# Patient Record
Sex: MALE | Race: WHITE
[De-identification: names, ages, dates, MRNs, and addresses within clinical notes are randomized per-mention and may not be internally consistent; named-entity substitution may affect disease eponyms.]

---

## 2017-03-14 NOTE — REP
Clinical:  Pain and disability.

 

Technique:  AP, lateral, coned-down views of the lumbosacral spine.

 

Findings:

Alignment and lordosis maintained.  No acute fracture / compression injury or

subluxation.  Mild endplate sclerosis with disc space narrowing at the L5-S1

level noted.

 

Impression:

Mild degenerative disc disease at the L5-S1 level.

 

 

Signed by

Haseeb Gomez MD 03/14/2017 06:14 P

## 2019-08-09 ENCOUNTER — HOSPITAL ENCOUNTER (OUTPATIENT)
Dept: HOSPITAL 53 - M SDC | Age: 34
Discharge: HOME | End: 2019-08-09
Attending: UROLOGY
Payer: MEDICARE

## 2019-08-09 VITALS — HEIGHT: 72 IN | WEIGHT: 225 LBS | BODY MASS INDEX: 30.48 KG/M2

## 2019-08-09 VITALS — DIASTOLIC BLOOD PRESSURE: 79 MMHG | SYSTOLIC BLOOD PRESSURE: 138 MMHG

## 2019-08-09 DIAGNOSIS — G47.30: ICD-10-CM

## 2019-08-09 DIAGNOSIS — K58.8: ICD-10-CM

## 2019-08-09 DIAGNOSIS — J44.9: ICD-10-CM

## 2019-08-09 DIAGNOSIS — F32.9: ICD-10-CM

## 2019-08-09 DIAGNOSIS — Z88.8: ICD-10-CM

## 2019-08-09 DIAGNOSIS — K21.9: ICD-10-CM

## 2019-08-09 DIAGNOSIS — Z79.899: ICD-10-CM

## 2019-08-09 DIAGNOSIS — L40.8: ICD-10-CM

## 2019-08-09 DIAGNOSIS — I10: ICD-10-CM

## 2019-08-09 DIAGNOSIS — N50.812: Primary | ICD-10-CM

## 2019-08-09 DIAGNOSIS — Z79.51: ICD-10-CM

## 2019-08-09 DIAGNOSIS — F17.210: ICD-10-CM

## 2019-08-09 PROCEDURE — 88305 TISSUE EXAM BY PATHOLOGIST: CPT

## 2019-08-09 PROCEDURE — 54520 REMOVAL OF TESTIS: CPT

## 2019-08-09 RX ADMIN — MORPHINE SULFATE PRN MG: 10 INJECTION INTRAVENOUS at 18:15

## 2019-08-09 RX ADMIN — FENTANYL CITRATE PRN MCG: 50 INJECTION, SOLUTION INTRAMUSCULAR; INTRAVENOUS at 17:35

## 2019-08-09 RX ADMIN — MORPHINE SULFATE PRN MG: 10 INJECTION INTRAVENOUS at 18:05

## 2019-08-09 RX ADMIN — MORPHINE SULFATE PRN MG: 10 INJECTION INTRAVENOUS at 18:00

## 2019-08-09 RX ADMIN — FENTANYL CITRATE PRN MCG: 50 INJECTION, SOLUTION INTRAMUSCULAR; INTRAVENOUS at 17:40

## 2019-08-09 RX ADMIN — MORPHINE SULFATE PRN MG: 10 INJECTION INTRAVENOUS at 18:20

## 2019-08-09 RX ADMIN — FENTANYL CITRATE PRN MCG: 50 INJECTION, SOLUTION INTRAMUSCULAR; INTRAVENOUS at 17:25

## 2019-08-09 RX ADMIN — MORPHINE SULFATE PRN MG: 10 INJECTION INTRAVENOUS at 18:10

## 2019-08-09 RX ADMIN — FENTANYL CITRATE PRN MCG: 50 INJECTION, SOLUTION INTRAMUSCULAR; INTRAVENOUS at 17:30

## 2019-08-09 NOTE — ROOPDOC
[facility] Report Of Operation


Report of Operation


DATE OF PROCEDURE: 8/9/19





PREPROCEDURE DIAGNOSES: Chronic Left Testicular Pain.





POSTPROCEDURE DIAGNOSES: Chronic Left Testicular Pain.





PROCEDURE: Left Simple Orchiectomy. 





SURGEON: Carlton Rivas MD





ASSISTANT: None





ANESTHESIA: General.





OPERATIVE INDICATIONS: This is a 33 year old male with chronic debilitating left

testicular 


pain with no obvious cause found on extensive testing.  Due to the severity of 

the pain he 


requested a left simple orchiectomy.  He is here today for this procedure.





DESCRIPTION OF PROCEDURE:  The patient was brought to the operating room and 

general


anesthesia was induced. Prophylactic antibiotics were infused. He was then 

placed


in the supine position in preparation for the above listed procedure. 





Next an approximately a 3-4 cm transverse incision was made over the left


hemiscrotum. We then dissected down through the scrotal wall layers. The 

testicle


was then delivered outside of the tunica vaginalis. The spermatic cord was then


carefully dissected and then high on the cord a Wanda clamp was placed. Just


distal to the Wanda clamp the cord was  into two separate packets and


then a clamp was placed on each packet. The spermatic cord was then transected


distal to those clamps. At this point 0 Vicryl free ties were placed each 

packet. 


Next an 0-Vicryl suture ligature was placed around the more proximal Wanda 

clamp. Once this was done hemostasis


was checked and any areas of bleeding were controlled with electrocautery. Once 

I


was satisfied with hemostasis the wound was irrigated. At this point we began


closing with a running 2-0 Vicryl suture. The skin was closed with interrupted


2-0 chromic sutures. Bacitracin ointment was applied to all of the incisions and


dressings were applied and this marked the conclusion of the procedure. The


patient was then awakened from anesthesia and transported to the recovery room 

in stable condition.





ESTIMATED BLOOD LOSS: Approximately 5 mL. 





COMPLICATIONS: None. 





SPECIMENS: Left Testicle.





PLAN: The patient will follow up in clinic in 1-2 weeks.











CARLTON RIVAS MD            Aug 9, 2019 17:11